# Patient Record
Sex: MALE | Race: WHITE | URBAN - METROPOLITAN AREA
[De-identification: names, ages, dates, MRNs, and addresses within clinical notes are randomized per-mention and may not be internally consistent; named-entity substitution may affect disease eponyms.]

---

## 2019-04-02 ENCOUNTER — THERAPY VISIT (OUTPATIENT)
Dept: PHYSICAL THERAPY | Facility: CLINIC | Age: 59
End: 2019-04-02
Payer: OTHER MISCELLANEOUS

## 2019-04-02 DIAGNOSIS — M54.42 ACUTE LEFT-SIDED LOW BACK PAIN WITH LEFT-SIDED SCIATICA: ICD-10-CM

## 2019-04-02 PROCEDURE — 97110 THERAPEUTIC EXERCISES: CPT | Mod: GP | Performed by: PHYSICAL THERAPIST

## 2019-04-02 PROCEDURE — 97161 PT EVAL LOW COMPLEX 20 MIN: CPT | Mod: GP | Performed by: PHYSICAL THERAPIST

## 2019-04-02 NOTE — LETTER
[facility] PHYSICAL THERAPY  [address] [address]  [phone]    2019    Re: Noah Light   :   1960  MRN:  [id number]   REFERRING PHYSICIAN:   Keith Crisostomo    [facility] PHYSICAL THERAP[facility]  Date of Initial Evaluation: 2019  Visits:  Rxs Used: 1  Reason for Referral:  Acute left-sided low back pain with left-sided sciatica    EVALUATION SUMMARY    [facility] Initial Evaluation  Subjective:  Patient reports onset of low back pain beginning on 3/27/19 at work when he bent over to lift a steel cassette. He reports he had lifted a couple cassettes earlier that morning that weighed 50-70#. He reports that he is currently on a 5# lifting restriction.    The history is provided by the patient. No  was used.   Noah Light is a 59 year old male with a lumbar condition.  Condition occurred with:  Bending.  Condition occurred: at work.  This is a new condition     Patient reports pain:  Lumbar spine left.  Radiates to:  Gluteals left (previously down to the left ankle until the past 2 days).  Pain is described as aching, sharp and stabbing and is constant and reported as 7/10.  Associated symptoms:  Loss of motion/stiffness. Pain is worse in the A.M..  Symptoms are exacerbated by bending, sitting, walking and standing and relieved by other (changing positions).  Since onset symptoms are gradually improving.        General health as reported by patient is good.  Pertinent medical history includes:  High blood pressure and overweight.  Medical allergies: yes (Sulfa).  Other surgeries include:  Other (appendix).  Current medications:  High blood pressure medication, steroids and pain medication.  Current occupation is .  Patient is working in normal job with restrictions (5# lifting restriction).  Primary job tasks include:  Lifting, operating a machine, prolonged standing, prolonged sitting, repetitive tasks and other  (computer work, pushing/pulling).    Barriers include:  None as reported by the patient.    Red flags:  None as reported by the patient.    [facility] Lumbar Evaluation  Posture:  Sitting: fair  Standing: good  Lordosis: WNL  Lateral Shift: no  Correction of Posture: no effect  Other Observations: Patient stands with weight shifted on to right side      Re: Noah Light   :   1960    Movement Loss:  Flexion (Flex): min  Extension (EXT): mod  Side Glide R (SG R): nil  Side Glide L (SG L): mod, major and pain  Test Movements:  FIS: During: increases  After: no worse    EIS: During: no effect  After: no effect    Repeat EIS: During: no effect  After: no effect    EIL: During: increases  After: no worse    Repeat EIL: During: increases  After: better  Mechanical Response: IncROM    Static Tests:  Lying Prone in Extension: MARLYS produced left foot numbness, NW on return to prone lying    Conclusion: derangement  Principle of Treatment:  Posture Correction: Neutral sitting with lumbar support    Extension: REIL 10x every 2-3 hours    Assessment/Plan:    Patient is a 59 year old male with lumbar complaints.    Patient has the following significant findings with corresponding treatment plan.                Diagnosis 1:  Low back pain    Pain -  self management, education, directional preference exercise and home program  Decreased ROM/flexibility - manual therapy, therapeutic exercise and home program  Impaired muscle performance - neuro re-education and home program  Decreased function - therapeutic activities and home program  Impaired posture - neuro re-education and home program    Therapy Evaluation Codes:   Cumulative Therapy Evaluation is: Low complexity.    Previous and current functional limitations:  (See Goal Flow Sheet for this information)    Short term and Long term goals: (See Goal Flow Sheet for this information)     Communication ability:  Patient appears to be able to clearly communicate and  understand verbal and written communication and follow directions correctly.  Treatment Explanation - The following has been discussed with the patient:   RX ordered/plan of care  Anticipated outcomes  Possible risks and side effects  This patient would benefit from PT intervention to resume normal activities.   Rehab potential is good.    Frequency:  1 X week, once daily  Duration:  for 6 weeks  Discharge Plan:  Achieve all LTG.  Independent in home treatment program.  Reach maximal therapeutic benefit.    Thank you for your referral.    INQUIRIES  Therapist: Quintin Cavanaugh DPT, Cert. MDT   [facility] PHYSICAL THERAPY  [address] [address]  Phone: [phone]  Fax: [phone]

## 2019-04-02 NOTE — PROGRESS NOTES
[facility] Initial Evaluation  Subjective:  Patient reports onset of low back pain beginning on 3/27/19 at work when he bent over to lift a steel cassette. He reports he had lifted a couple cassettes earlier that morning that weighed 50-70#. He reports that he is currently on a 5# lifting restriction.      The history is provided by the patient. No  was used.   Noah Light is a 59 year old male with a lumbar condition.  Condition occurred with:  Bending.  Condition occurred: at work.  This is a new condition     Patient reports pain:  Lumbar spine left.  Radiates to:  Gluteals left (previously down to the left ankle until the past 2 days).  Pain is described as aching, sharp and stabbing and is constant and reported as 7/10.  Associated symptoms:  Loss of motion/stiffness. Pain is worse in the A.M..  Symptoms are exacerbated by bending, sitting, walking and standing and relieved by other (changing positions).  Since onset symptoms are gradually improving.        General health as reported by patient is good.  Pertinent medical history includes:  High blood pressure and overweight.  Medical allergies: yes (Sulfa).  Other surgeries include:  Other (appendix).  Current medications:  High blood pressure medication, steroids and pain medication.  Current occupation is .  Patient is working in normal job with restrictions (5# lifting restriction).  Primary job tasks include:  Lifting, operating a machine, prolonged standing, prolonged sitting, repetitive tasks and other (computer work, pushing/pulling).    Barriers include:  None as reported by the patient.    Red flags:  None as reported by the patient.                        Objective:  System    Physical Exam      [facility] Lumbar Evaluation    Posture:  Sitting: fair  Standing: good  Lordosis: WNL  Lateral Shift: no  Correction of Posture: no effect  Other Observations: Patient stands with weight shifted on to right  side  Movement Loss:  Flexion (Flex): min  Extension (EXT): mod  Side Glide R (SG R): nil  Side Glide L (SG L): mod, major and pain  Test Movements:  FIS: During: increases  After: no worse      EIS: During: no effect  After: no effect    Repeat EIS: During: no effect  After: no effect      EIL: During: increases  After: no worse    Repeat EIL: During: increases  After: better  Mechanical Response: IncROM      Static Tests:          Lying Prone in Extension: MARLYS produced left foot numbness, NW on return to prone lying    Conclusion: derangement  Principle of Treatment:  Posture Correction: Neutral sitting with lumbar support    Extension: REIL 10x every 2-3 hours                                           ROS    Assessment/Plan:    Patient is a 59 year old male with lumbar complaints.    Patient has the following significant findings with corresponding treatment plan.                Diagnosis 1:  Low back pain    Pain -  self management, education, directional preference exercise and home program  Decreased ROM/flexibility - manual therapy, therapeutic exercise and home program  Impaired muscle performance - neuro re-education and home program  Decreased function - therapeutic activities and home program  Impaired posture - neuro re-education and home program    Therapy Evaluation Codes:     Cumulative Therapy Evaluation is: Low complexity.    Previous and current functional limitations:  (See Goal Flow Sheet for this information)    Short term and Long term goals: (See Goal Flow Sheet for this information)     Communication ability:  Patient appears to be able to clearly communicate and understand verbal and written communication and follow directions correctly.  Treatment Explanation - The following has been discussed with the patient:   RX ordered/plan of care  Anticipated outcomes  Possible risks and side effects  This patient would benefit from PT intervention to resume normal activities.   Rehab potential is  good.    Frequency:  1 X week, once daily  Duration:  for 6 weeks  Discharge Plan:  Achieve all LTG.  Independent in home treatment program.  Reach maximal therapeutic benefit.    Please refer to the daily flowsheet for treatment today, total treatment time and time spent performing 1:1 timed codes.

## 2019-04-05 ENCOUNTER — THERAPY VISIT (OUTPATIENT)
Dept: PHYSICAL THERAPY | Facility: CLINIC | Age: 59
End: 2019-04-05
Payer: OTHER MISCELLANEOUS

## 2019-04-05 DIAGNOSIS — M54.42 ACUTE LEFT-SIDED LOW BACK PAIN WITH LEFT-SIDED SCIATICA: ICD-10-CM

## 2019-04-05 PROCEDURE — 97110 THERAPEUTIC EXERCISES: CPT | Mod: GP | Performed by: PHYSICAL THERAPIST

## 2019-04-05 PROCEDURE — 97112 NEUROMUSCULAR REEDUCATION: CPT | Mod: GP | Performed by: PHYSICAL THERAPIST

## 2019-05-30 PROBLEM — M54.42 ACUTE LEFT-SIDED LOW BACK PAIN WITH LEFT-SIDED SCIATICA: Status: RESOLVED | Noted: 2019-04-02 | Resolved: 2019-05-30

## 2019-05-30 NOTE — PROGRESS NOTES
Subjective:  HPI                    Objective:  System    Physical Exam    General     ROS    Assessment/Plan:    DISCHARGE REPORT    Progress reporting period is from 4/5/19 to 5/30/19.     SUBJECTIVE  Subjective: Pt reports that his back is feeling better than earlier this week and is no longer walking with a limp; he reports that he does notice sitting will aggravate symptoms but feels better if he is able to move between sitting and standing; he reports that stretching side to side still pinches   Current Pain level: 2/10   Initial Pain level: 7/10        ;   ,     Patient has failed to return to therapy so current objective findings are unknown.  The subjective and objective information are from the last SOAP note on this patient.    OBJECTIVE  Objective: AROM lumbar flexion Min loss, extension Min loss, bilat SG WNL (pinch in low back with left SG)      ASSESSMENT/PLAN  Updated problem list and treatment plan: Diagnosis 1:  Low back pain        STG/LTGs have been met or progress has been made towards goals:  Yes (See Goal flow sheet completed today.)  Assessment of Progress: The patient has not returned to therapy. Current status is unknown.  Self Management Plans:  Patient has been instructed in a home treatment program.  Patient  has been instructed in self management of symptoms.  I have re-evaluated this patient and find that the nature, scope, duration and intensity of the therapy is appropriate for the medical condition of the patient.  Noah continues to require the following intervention to meet STG and LTG's: PT intervention is no longer required to meet STG/LTG.  The patient failed to complete scheduled/ordered appointments so current information is unknown.  We will discharge this patient from PT.    Recommendations:  This patient is ready to be discharged from therapy and continue their home treatment program.    Please refer to the daily flowsheet for treatment today, total treatment time and time  spent performing 1:1 timed codes.